# Patient Record
Sex: FEMALE | Race: WHITE | NOT HISPANIC OR LATINO | Employment: FULL TIME | URBAN - METROPOLITAN AREA
[De-identification: names, ages, dates, MRNs, and addresses within clinical notes are randomized per-mention and may not be internally consistent; named-entity substitution may affect disease eponyms.]

---

## 2023-11-02 ENCOUNTER — APPOINTMENT (OUTPATIENT)
Dept: RADIOLOGY | Facility: CLINIC | Age: 28
End: 2023-11-02

## 2023-12-11 ENCOUNTER — OFFICE VISIT (OUTPATIENT)
Dept: PLASTIC SURGERY | Facility: CLINIC | Age: 28
End: 2023-12-11
Payer: COMMERCIAL

## 2023-12-11 VITALS
DIASTOLIC BLOOD PRESSURE: 84 MMHG | HEART RATE: 72 BPM | BODY MASS INDEX: 47.29 KG/M2 | WEIGHT: 293 LBS | SYSTOLIC BLOOD PRESSURE: 159 MMHG

## 2023-12-11 DIAGNOSIS — N62 MACROMASTIA: Primary | ICD-10-CM

## 2023-12-11 PROCEDURE — 99205 OFFICE O/P NEW HI 60 MIN: CPT | Performed by: SURGERY

## 2023-12-11 NOTE — PROGRESS NOTES
Department of Plastic and Reconstructive Surgery            New patient visit    Date: 12/11/2023       Subjective   Stacy Coffman is a 28 y.o. female who was self-referred for macromastia.    Stacy is a 28-year-old female who has long had macromastia, chronic neck and back pain.  She is a non-smoker.  Past medical history is noncontributory.        Objective    Vitals:    12/11/23 1502   BP: 159/84   Pulse: 72       Gen: interactive and pleasant  Head: NCAT  Eyes: EOMI, PERRLA  Mouth: MMM  Throat: trachea midline  Cor: RRR  Pulm: nonlabored breathing  Abd: s/nt/nd  Neuro: AAOx3  Ext: extremities perfused    Body mass index is 47.29 kg/m².          Assessment/Plan       Stacy is a 28 y.o. female who presents for macromastia.    Patient suffers from obvious macromastia.    The patient requires over-the-counter medication and states approximately 15-20 times per month to alleviate the neck and back pain related to macromastia  Patient has not attempted physical therapy, nor has she and has attempted weight loss, neither of which have alleviated symptoms.      Plan:   We will refer to physical therapy colleagues as well as we will referred her to weight loss management.  We discussed with her that elective surgery would not be undertaken until BMI was under 40.    I spent 60 minutes with this patient. Greater than 50% of this time was spent in the counselling and/or coordination of care of this patient.  This note was created using voice recognition software and was not corrected for typographical or grammatical errors.    Signature: Toby Campoverde MD   Date: 12/11/2023

## 2023-12-18 ENCOUNTER — APPOINTMENT (OUTPATIENT)
Dept: NUTRITION | Facility: CLINIC | Age: 28
End: 2023-12-18
Payer: COMMERCIAL

## 2024-01-09 ENCOUNTER — EVALUATION (OUTPATIENT)
Dept: PHYSICAL THERAPY | Facility: CLINIC | Age: 29
End: 2024-01-09
Payer: COMMERCIAL

## 2024-01-09 ENCOUNTER — APPOINTMENT (OUTPATIENT)
Dept: NUTRITION | Facility: CLINIC | Age: 29
End: 2024-01-09
Payer: COMMERCIAL

## 2024-01-09 DIAGNOSIS — M54.2 NECK PAIN: Primary | ICD-10-CM

## 2024-01-09 DIAGNOSIS — M54.9 MID BACK PAIN: ICD-10-CM

## 2024-01-09 DIAGNOSIS — M54.50 LOW BACK PAIN: ICD-10-CM

## 2024-01-09 DIAGNOSIS — N62 MACROMASTIA: ICD-10-CM

## 2024-01-09 PROCEDURE — 97110 THERAPEUTIC EXERCISES: CPT | Mod: GP | Performed by: PHYSICAL THERAPIST

## 2024-01-09 PROCEDURE — 97161 PT EVAL LOW COMPLEX 20 MIN: CPT | Mod: GP | Performed by: PHYSICAL THERAPIST

## 2024-01-09 ASSESSMENT — PAIN DESCRIPTION - DESCRIPTORS: DESCRIPTORS: SHARP;ACHING

## 2024-01-09 ASSESSMENT — ENCOUNTER SYMPTOMS
OCCASIONAL FEELINGS OF UNSTEADINESS: 0
DEPRESSION: 0
LOSS OF SENSATION IN FEET: 0

## 2024-01-09 ASSESSMENT — PATIENT HEALTH QUESTIONNAIRE - PHQ9
9. THOUGHTS THAT YOU WOULD BE BETTER OFF DEAD, OR OF HURTING YOURSELF: NOT AT ALL
SUM OF ALL RESPONSES TO PHQ QUESTIONS 1-9: 13
8. MOVING OR SPEAKING SO SLOWLY THAT OTHER PEOPLE COULD HAVE NOTICED. OR THE OPPOSITE, BEING SO FIGETY OR RESTLESS THAT YOU HAVE BEEN MOVING AROUND A LOT MORE THAN USUAL: MORE THAN HALF THE DAYS
10. IF YOU CHECKED OFF ANY PROBLEMS, HOW DIFFICULT HAVE THESE PROBLEMS MADE IT FOR YOU TO DO YOUR WORK, TAKE CARE OF THINGS AT HOME, OR GET ALONG WITH OTHER PEOPLE: NOT DIFFICULT AT ALL
7. TROUBLE CONCENTRATING ON THINGS, SUCH AS READING THE NEWSPAPER OR WATCHING TELEVISION: SEVERAL DAYS
3. TROUBLE FALLING OR STAYING ASLEEP OR SLEEPING TOO MUCH: SEVERAL DAYS
2. FEELING DOWN, DEPRESSED OR HOPELESS: MORE THAN HALF THE DAYS
6. FEELING BAD ABOUT YOURSELF - OR THAT YOU ARE A FAILURE OR HAVE LET YOURSELF OR YOUR FAMILY DOWN: MORE THAN HALF THE DAYS
1. LITTLE INTEREST OR PLEASURE IN DOING THINGS: MORE THAN HALF THE DAYS
5. POOR APPETITE OR OVEREATING: MORE THAN HALF THE DAYS
SUM OF ALL RESPONSES TO PHQ9 QUESTIONS 1 AND 2: 4
4. FEELING TIRED OR HAVING LITTLE ENERGY: SEVERAL DAYS

## 2024-01-09 ASSESSMENT — PAIN - FUNCTIONAL ASSESSMENT: PAIN_FUNCTIONAL_ASSESSMENT: 0-10

## 2024-01-09 ASSESSMENT — PAIN SCALES - GENERAL: PAINLEVEL_OUTOF10: 4

## 2024-01-09 NOTE — PROGRESS NOTES
Physical Therapy    Physical Therapy Cervical Spine Evaluation    Patient Name: Stacy Coffman  MRN: [id number]  Today's Date: 1/9/2024  Time Calculation  Start Time: 1000  Stop Time: 1045  Time Calculation (min): 45 min    Current Problem  Problem List Items Addressed This Visit             ICD-10-CM    Neck pain - Primary M54.2    Relevant Orders    Follow Up In Physical Therapy    Mid back pain M54.9    Relevant Orders    Follow Up In Physical Therapy    Low back pain M54.50    Relevant Orders    Follow Up In Physical Therapy     Other Visit Diagnoses         Codes    Macromastia     N62    Relevant Orders    Follow Up In Physical Therapy           Precautions  Precautions  Precautions Comment: none     Pain  Pain Assessment: 0-10  Pain Score: 4 (7/10 at worst)  Pain Location: Neck (mid and low back)  Pain Descriptors: Sharp, Aching  Pain Frequency: Constant/continuous    SUBJECTIVE:   Chief complaint:  Pt reports she has has a hx of upper back, neck, and low back pain for several years due to macromastia. Pt notes pain can worsen as she stands. Notes better with sitting. Notes pain is constant. Notes some radiation into the UE/LE's in the forearm/shins. Denies any numbness and tingling. Notes some tightness in the cervical region. Notes some weakness in the UE and postural region.     Pain Better: sitting, heat, OTC meds    Pain Worse: standing, lifting     Imaging: no imaging noted at this time.     Prior level of function: previously independent with all prior activity/ADL's.   Current limitations: standing, lifting, bending.    Home setup:reviewed and no concern     Work: Call center customer service     Patients goal: pain management    Prior tx:no prior PT tx this yr or last yr.     Medical hx has been reviewed with the patient.     OBJECTIVE:    Posture: rounded shoulder/forward head     Upper extremity ROM: WNL Unless documented below:  LE ROM WNL      Upper Extremity Strength:   RIGHT LEFT   Shoulder  Flexion 4+ 4+   Shoulder Abduction 4+ 4+   Shoulder ER 4+ 4+   Shoulder IR 4+ 4+   Rhomboids 4 4   Mid trap  4 4   LE strength: Grossly 5/5 throughout     Cervical ROM:  Date: eval ROM-degrees    Flexion 35 pain     Extension 50     RIGHT LEFT   Side bend     Rotation 55 55     Lumbar ROM:  Date: eval Percentage     Flexion 75%    Extension 50%     RIGHT LEFT   Side bend 75% 75%   Rotation 50% 50%     Palpation: tenderness throughout the B UT cervical spine.    Special tests:  Stephen Cervical repeated movements: all negative or inconclusive for pain production/reduction  Lumbar repeated movements: LI reduced low back pain. RFIS worsened pain    Other Measures  Neck Disability Index: 30%  Oswestry Disablity Index (PEREZ): 28%     TREATMENT:  Eval  2. Instruction in a HEP:   Access Code: [email or web address]  URL: [email or web address]  Date: 01/09/2024  Prepared by: JACKELINE St    Exercises  - Seated Thoracic Lumbar Extension  - 1 x daily - 7 x weekly - 1 sets - 10 reps - 5 sec hold  - Seated Cervical Sidebending Stretch  - 1 x daily - 7 x weekly - 1 sets - 5 reps - 15 sec hold  - Cat Cow to Child's Pose  - 1 x daily - 7 x weekly - 2 sets - 10 reps  - Child's Pose Stretch  - 1 x daily - 7 x weekly - 1 sets - 5 reps - 10 sec hold  - Standing Row with Anchored Resistance  - 1 x daily - 7 x weekly - 2 sets - 10 reps  - Shoulder extension with resistance - Neutral  - 1 x daily - 7 x weekly - 2 sets - 10 reps  - Standing Lumbar Extension  - 1 x daily - 7 x weekly - 2 sets - 10 reps    ASSESSEMENT  Pt is a 28 y.o.  referred for physical therapy by Toby Campoverde MD  for neck pain/mid back pain and low back pain due to macromastia.. Pt presents with upper/mid/low back pain, loss of motion, reduced posture, weakness and decreased function. Pt's signs and symptoms consistent with muscular strain from macromastia and this patient would benefit from a therapy program to restore prior level of function, decrease  pain, increase AROM, increase strength and improve posture.    The physical therapy prognosis is good for the patient to achieve their goals.   The pt tolerated therapy treatment today well with no adverse effects.  Barriers to therapy/learning include: none    PLAN  The pt will be seen 1-2 days a week for 4-6 weeks.      The pt has been educated about the risks and benefits of physical therapy including manual therapy treatments. Pt agrees with POC and gives consent for treatment.     The patient will benefit from physical therapy treatment to include: therapeutic exercises, therapeutic activities, neuromuscular re-education, manual therapy, modalities, and a home exercise program.     Goals:  Active       PT Problem       STG: Reduce pain at worst to 4/10 with all prior activity in her spine.        Start:  01/09/24    Expected End:  01/29/24            STG: Pt to sit with good posture approx 50-75% of the time.        Start:  01/09/24    Expected End:  01/29/24            Reduce pain at worst to 1-2/10 with all prior activity.        Start:  01/09/24    Expected End:  02/20/24            Increase cervical flexion to 60 degrees, ext to 55 degrees, rotation bilat to 75 degrees for posture, driving and self care.        Start:  01/09/24    Expected End:  02/20/24            Increase lumbar flexion to 100%, ext to %, rotation bilat to 100% for self care, posture and ADL's.        Start:  01/09/24    Expected End:  02/20/24            Pt to have decrease in Oswestry by 10% to display increased overall function. Pt to have NDI score decreased by 10% to display increased overall function.        Start:  01/09/24    Expected End:  02/20/24            Pt to be independent with a HEP for self management.        Start:  01/09/24    Expected End:  02/20/24

## 2024-01-16 ENCOUNTER — NUTRITION (OUTPATIENT)
Dept: NUTRITION | Facility: CLINIC | Age: 29
End: 2024-01-16
Payer: COMMERCIAL

## 2024-01-16 DIAGNOSIS — Z71.3 DIETARY COUNSELING AND SURVEILLANCE: Primary | ICD-10-CM

## 2024-01-16 DIAGNOSIS — N62 MACROMASTIA: ICD-10-CM

## 2024-01-16 PROCEDURE — 97802 MEDICAL NUTRITION INDIV IN: CPT

## 2024-01-18 ENCOUNTER — TREATMENT (OUTPATIENT)
Dept: PHYSICAL THERAPY | Facility: CLINIC | Age: 29
End: 2024-01-18
Payer: COMMERCIAL

## 2024-01-18 DIAGNOSIS — N62 MACROMASTIA: ICD-10-CM

## 2024-01-18 DIAGNOSIS — M54.2 NECK PAIN: Primary | ICD-10-CM

## 2024-01-18 DIAGNOSIS — M54.50 LOW BACK PAIN: ICD-10-CM

## 2024-01-18 DIAGNOSIS — M54.9 MID BACK PAIN: ICD-10-CM

## 2024-01-18 PROCEDURE — 97110 THERAPEUTIC EXERCISES: CPT | Mod: GP | Performed by: PHYSICAL THERAPIST

## 2024-01-18 ASSESSMENT — PAIN - FUNCTIONAL ASSESSMENT: PAIN_FUNCTIONAL_ASSESSMENT: 0-10

## 2024-01-18 ASSESSMENT — PAIN SCALES - GENERAL: PAINLEVEL_OUTOF10: 4

## 2024-01-18 NOTE — PROGRESS NOTES
Physical Therapy    Physical Therapy Treatment    Patient Name: Stacy Coffman  MRN: [id number]  Today's Date: 1/18/2024  Time Calculation  Start Time: 0830  Stop Time: 0915  Time Calculation (min): 45 min    Current Problem  Problem List Items Addressed This Visit             ICD-10-CM    Neck pain - Primary M54.2    Mid back pain M54.9    Low back pain M54.50     Other Visit Diagnoses         Codes    Macromastia     N62           Subjective   Pt reports she did ok with the HEP thus far. Notes some soreness post exercises.   Compliance with HEP-yes    Precautions  Precautions  Precautions Comment: none    Pain  Pain Assessment: 0-10  Pain Score: 4  Pain Location: Neck    Objective   No measures     Treatments:  There-ex: x 45 min   UBE x 5 min  Trunk flex with ball 10 x ea  Trunk flex with ball and S/B R/L 10 x ea  T-band rows GTB 2 x 10  T-band ext GTB 2 x 10   T-band ER RTB 2 x 10   Thoracic ext over 1/2 foam roll 2 x 10   Shoulder dumbbells flex, abd 2# 2 x 10   Scap retraction 2 x 10 2#  T- bar ext 3# 2 x 10  3 way posture 3 x 10 sec ea    Reviewed HEP     Assessment:  Pt overall tolerated tx well today with no issues. Some soreness noted post tx. Recommended to continue with current HEP.     Plan:  Pt to continue with strengthening her posture as tolerated.     Goals:  Active       PT Problem       STG: Reduce pain at worst to 4/10 with all prior activity in her spine.        Start:  01/09/24    Expected End:  01/29/24            STG: Pt to sit with good posture approx 50-75% of the time.        Start:  01/09/24    Expected End:  01/29/24            Reduce pain at worst to 1-2/10 with all prior activity.        Start:  01/09/24    Expected End:  02/20/24            Increase cervical flexion to 60 degrees, ext to 55 degrees, rotation bilat to 75 degrees for posture, driving and self care.        Start:  01/09/24    Expected End:  02/20/24            Increase lumbar flexion to 100%, ext to %, rotation  bilat to 100% for self care, posture and ADL's.        Start:  01/09/24    Expected End:  02/20/24            Pt to have decrease in Oswestry by 10% to display increased overall function. Pt to have NDI score decreased by 10% to display increased overall function.        Start:  01/09/24    Expected End:  02/20/24            Pt to be independent with a HEP for self management.        Start:  01/09/24    Expected End:  02/20/24

## 2024-01-23 ENCOUNTER — DOCUMENTATION (OUTPATIENT)
Dept: PHYSICAL THERAPY | Facility: CLINIC | Age: 29
End: 2024-01-23
Payer: COMMERCIAL

## 2024-01-23 ENCOUNTER — OFFICE VISIT (OUTPATIENT)
Dept: URGENT CARE | Facility: CLINIC | Age: 29
End: 2024-01-23
Payer: COMMERCIAL

## 2024-01-23 ENCOUNTER — APPOINTMENT (OUTPATIENT)
Dept: PHYSICAL THERAPY | Facility: CLINIC | Age: 29
End: 2024-01-23
Payer: COMMERCIAL

## 2024-01-23 ENCOUNTER — HOSPITAL ENCOUNTER (EMERGENCY)
Facility: HOSPITAL | Age: 29
Discharge: HOME | End: 2024-01-23
Payer: COMMERCIAL

## 2024-01-23 VITALS
HEIGHT: 68 IN | HEART RATE: 76 BPM | BODY MASS INDEX: 44.41 KG/M2 | WEIGHT: 293 LBS | DIASTOLIC BLOOD PRESSURE: 84 MMHG | SYSTOLIC BLOOD PRESSURE: 153 MMHG | RESPIRATION RATE: 16 BRPM | TEMPERATURE: 97.3 F | OXYGEN SATURATION: 95 %

## 2024-01-23 VITALS
HEART RATE: 98 BPM | TEMPERATURE: 98.4 F | OXYGEN SATURATION: 95 % | SYSTOLIC BLOOD PRESSURE: 136 MMHG | DIASTOLIC BLOOD PRESSURE: 79 MMHG

## 2024-01-23 DIAGNOSIS — K64.9 HEMORRHOIDS, UNSPECIFIED HEMORRHOID TYPE: ICD-10-CM

## 2024-01-23 DIAGNOSIS — J02.9 VIRAL PHARYNGITIS: Primary | ICD-10-CM

## 2024-01-23 DIAGNOSIS — J02.9 SORE THROAT: ICD-10-CM

## 2024-01-23 PROBLEM — N62 LARGE BREASTS: Status: ACTIVE | Noted: 2024-01-23

## 2024-01-23 PROBLEM — F32.A DEPRESSIVE DISORDER: Status: ACTIVE | Noted: 2024-01-23

## 2024-01-23 PROBLEM — R07.9 CHEST PAIN: Status: RESOLVED | Noted: 2024-01-23 | Resolved: 2024-01-23

## 2024-01-23 PROBLEM — N94.6 DYSMENORRHEA: Status: ACTIVE | Noted: 2024-01-23

## 2024-01-23 PROBLEM — R51.9 CHRONIC HEADACHE: Status: ACTIVE | Noted: 2019-03-18

## 2024-01-23 PROBLEM — N94.6 SEVERE MENSTRUAL CRAMPS: Status: ACTIVE | Noted: 2024-01-23

## 2024-01-23 PROBLEM — G89.29 CHRONIC HEADACHE: Status: ACTIVE | Noted: 2019-03-18

## 2024-01-23 PROBLEM — F90.9 ATTENTION-DEFICIT/HYPERACTIVITY DISORDER: Status: ACTIVE | Noted: 2024-01-23

## 2024-01-23 PROBLEM — Q14.2 OPTIC DISC ANOMALY: Status: ACTIVE | Noted: 2017-12-11

## 2024-01-23 PROBLEM — F32.A ANXIETY AND DEPRESSION: Status: ACTIVE | Noted: 2024-01-23

## 2024-01-23 PROBLEM — F41.9 ANXIETY AND DEPRESSION: Status: ACTIVE | Noted: 2024-01-23

## 2024-01-23 PROBLEM — H52.13 MYOPIA OF BOTH EYES: Status: ACTIVE | Noted: 2017-12-11

## 2024-01-23 PROBLEM — R42 DIZZINESS: Status: RESOLVED | Noted: 2024-01-23 | Resolved: 2024-01-23

## 2024-01-23 PROBLEM — N20.0 NEPHROLITHIASIS: Status: RESOLVED | Noted: 2024-01-23 | Resolved: 2024-01-23

## 2024-01-23 PROBLEM — S92.109A CLOSED FRACTURE OF TALUS: Status: RESOLVED | Noted: 2024-01-23 | Resolved: 2024-01-23

## 2024-01-23 PROBLEM — J30.2 SEASONAL ALLERGIES: Status: ACTIVE | Noted: 2024-01-23

## 2024-01-23 PROBLEM — S93.402A SPRAIN OF LEFT ANKLE: Status: RESOLVED | Noted: 2024-01-23 | Resolved: 2024-01-23

## 2024-01-23 PROBLEM — R31.29 MICROSCOPIC HEMATURIA: Status: RESOLVED | Noted: 2024-01-23 | Resolved: 2024-01-23

## 2024-01-23 PROBLEM — N23 RENAL COLIC: Status: ACTIVE | Noted: 2022-11-09

## 2024-01-23 LAB — POC RAPID STREP: NEGATIVE

## 2024-01-23 PROCEDURE — 87880 STREP A ASSAY W/OPTIC: CPT

## 2024-01-23 PROCEDURE — 99213 OFFICE O/P EST LOW 20 MIN: CPT

## 2024-01-23 PROCEDURE — 99283 EMERGENCY DEPT VISIT LOW MDM: CPT | Performed by: NURSE PRACTITIONER

## 2024-01-23 PROCEDURE — 69210 REMOVE IMPACTED EAR WAX UNI: CPT | Mod: LT | Performed by: NURSE PRACTITIONER

## 2024-01-23 RX ORDER — HYDROCORTISONE 25 MG/G
1 CREAM TOPICAL 2 TIMES DAILY
Qty: 6 G | Refills: 0 | Status: SHIPPED | OUTPATIENT
Start: 2024-01-23 | End: 2024-02-22

## 2024-01-23 RX ORDER — FLUTICASONE PROPIONATE 50 MCG
SPRAY, SUSPENSION (ML) NASAL
COMMUNITY
Start: 2014-05-11

## 2024-01-23 RX ORDER — FLUTICASONE PROPIONATE AND SALMETEROL 50; 250 UG/1; UG/1
POWDER RESPIRATORY (INHALATION)
COMMUNITY
Start: 2023-06-12

## 2024-01-23 RX ORDER — ETONOGESTREL AND ETHINYL ESTRADIOL VAGINAL RING .015; .12 MG/D; MG/D
RING VAGINAL
COMMUNITY
Start: 2020-08-28

## 2024-01-23 RX ORDER — METHYLPREDNISOLONE 4 MG/1
TABLET ORAL
Qty: 21 TABLET | Refills: 0 | Status: SHIPPED | OUTPATIENT
Start: 2024-01-23

## 2024-01-23 RX ORDER — ACETAZOLAMIDE 250 MG/1
1 TABLET ORAL 2 TIMES DAILY
COMMUNITY
Start: 2019-03-18

## 2024-01-23 RX ORDER — FLUOXETINE HYDROCHLORIDE 40 MG/1
CAPSULE ORAL
COMMUNITY
Start: 2022-04-19

## 2024-01-23 RX ORDER — CETIRIZINE HYDROCHLORIDE 10 MG/1
TABLET ORAL
COMMUNITY
Start: 2022-04-19

## 2024-01-23 RX ORDER — ALBUTEROL SULFATE 90 UG/1
AEROSOL, METERED RESPIRATORY (INHALATION)
COMMUNITY
Start: 2023-06-12

## 2024-01-23 ASSESSMENT — ENCOUNTER SYMPTOMS
SINUS PAIN: 0
CHEST TIGHTNESS: 0
PALPITATIONS: 0
MYALGIAS: 0
CHILLS: 0
VOMITING: 0
ARTHRALGIAS: 0
NEUROLOGICAL NEGATIVE: 1
MUSCULOSKELETAL NEGATIVE: 1
SORE THROAT: 1
SHORTNESS OF BREATH: 0
NAUSEA: 0
GASTROINTESTINAL NEGATIVE: 1
DIAPHORESIS: 1
SINUS PRESSURE: 0
RHINORRHEA: 0
FATIGUE: 0
CARDIOVASCULAR NEGATIVE: 1
HEADACHES: 0
DIZZINESS: 0
ABDOMINAL PAIN: 0
ADENOPATHY: 1
TROUBLE SWALLOWING: 1
FEVER: 0
COUGH: 0
DIARRHEA: 0

## 2024-01-23 ASSESSMENT — COLUMBIA-SUICIDE SEVERITY RATING SCALE - C-SSRS
2. HAVE YOU ACTUALLY HAD ANY THOUGHTS OF KILLING YOURSELF?: NO
6. HAVE YOU EVER DONE ANYTHING, STARTED TO DO ANYTHING, OR PREPARED TO DO ANYTHING TO END YOUR LIFE?: NO
1. IN THE PAST MONTH, HAVE YOU WISHED YOU WERE DEAD OR WISHED YOU COULD GO TO SLEEP AND NOT WAKE UP?: NO

## 2024-01-23 NOTE — PROGRESS NOTES
Physical Therapy                 Therapy Communication Note    Patient Name: Stacy Coffman  MRN: [id number]  Today's Date: 1/23/2024     Discipline: Physical Therapy    Missed Visit Reason: Pt cx via [employer].     Missed Time: Cancel    Comment:

## 2024-01-23 NOTE — PROGRESS NOTES
Subjective     Satcy Coffman is a 28 y.o. female who presents for Sore Throat.    Patient presents with sore throat, painful swallowing, and sinus drainage worsening over the last 4 days.       History provided by:  Patient and medical records  Sore Throat   Associated symptoms include ear pain and trouble swallowing. Pertinent negatives include no abdominal pain, congestion, coughing, diarrhea, headaches, shortness of breath or vomiting.       /79   Pulse 98   Temp 36.9 °C (98.4 °F)   LMP  (LMP Unknown)   SpO2 95%    All vitals have been reviewed and are stable.    Review of Systems   Constitutional:  Positive for diaphoresis. Negative for chills, fatigue and fever.   HENT:  Positive for ear pain, postnasal drip, sore throat and trouble swallowing. Negative for congestion, dental problem, rhinorrhea, sinus pressure and sinus pain.    Respiratory:  Negative for cough, chest tightness and shortness of breath.    Cardiovascular: Negative.  Negative for chest pain and palpitations.   Gastrointestinal: Negative.  Negative for abdominal pain, diarrhea, nausea and vomiting.   Musculoskeletal: Negative.  Negative for arthralgias and myalgias.   Skin: Negative.  Negative for rash.   Neurological: Negative.  Negative for dizziness and headaches.   Hematological:  Positive for adenopathy.       Objective   Physical Exam  Vitals and nursing note reviewed.   Constitutional:       General: She is awake. She is not in acute distress.     Appearance: Normal appearance. She is well-developed.   HENT:      Head: Normocephalic and atraumatic.      Right Ear: Tympanic membrane, ear canal and external ear normal.      Left Ear: Tympanic membrane, ear canal and external ear normal.      Nose: Nose normal. No congestion or rhinorrhea.      Mouth/Throat:      Lips: Pink.      Mouth: Mucous membranes are moist.      Palate: No lesions.      Pharynx: Uvula midline. Oropharyngeal exudate and posterior oropharyngeal erythema  present.      Tonsils: No tonsillar exudate.   Eyes:      Extraocular Movements: Extraocular movements intact.      Conjunctiva/sclera: Conjunctivae normal.      Pupils: Pupils are equal, round, and reactive to light.   Cardiovascular:      Rate and Rhythm: Normal rate and regular rhythm.   Pulmonary:      Effort: Pulmonary effort is normal. No respiratory distress.   Abdominal:      General: Abdomen is flat. There is no distension.   Musculoskeletal:         General: No swelling or deformity. Normal range of motion.      Cervical back: Normal range of motion.   Skin:     General: Skin is warm and dry.   Neurological:      General: No focal deficit present.      Mental Status: She is alert and oriented to person, place, and time. Mental status is at baseline.      Motor: Motor function is intact.      Coordination: Coordination is intact.   Psychiatric:         Mood and Affect: Mood and affect normal.         Speech: Speech normal.         Behavior: Behavior normal.         Thought Content: Thought content normal.         Judgment: Judgment normal.         Assessment/Plan   Problem List Items Addressed This Visit    None  Visit Diagnoses       Sore throat        Relevant Medications    methylPREDNISolone (Medrol Dospak) 4 mg tablets    Other Relevant Orders    POCT rapid strep A manually resulted (Completed)            Red flags for reporting to ER have been reviewed with the patient.    Current diagnosis, any medication changes, and all in-office lab or radiologic results have been reviewed with the patient at the time of the visit.   If symptoms do not improve or worsen, patient is to follow up with PCP or report to the emergency room.   Patient is alert and oriented x3 and non-toxic appearing. Vital signs are stable.   Patient and/or guardian has sufficient decision-making capabilities at this time and reports understanding and agreement with the treatment plan made through shared decision-making.

## 2024-01-24 NOTE — ED PROVIDER NOTES
HPI   Chief Complaint   Patient presents with    Earache     left    Sore Throat     Was seen at urgent care today and had strep done and it was negative.     Abdominal Pain     Lower abd cramping, states she has IBS but only has flareups during period. Today she had diarrhea and noticed blood and mucus in stool       28-year-old female with no significant past medical history presents to the emergency department today for sore throat, left ear pain and sinus drainage as well as blood in her stool tonight.  Patient states the sore throat and ear pain have been present for the past 4 days.  She was seen at an urgent care today and had a strep swab completed which was negative.  She was discharged home on a Medrol Dosepak.  States this afternoon she was lying in bed had some abdominal cramping.  She was having a difficult time with a bowel movement and noticed some blood in the toilet water as well as on her tissue paper and became concerned.  Mother has a history of IBS and she states that she is highly suspicious that she may also have irritable bowel syndrome but has not been diagnosed.  No fever or chills.  She is denying any current abdominal pain states she is feeling well overall other than the sore throat and left ear pain.  Patient denies any dizziness, headache, congestion, coughing, shortness of breath, nausea, vomiting.  No syncope.                          Luis Coma Scale Score: 15                  Patient History   Past Medical History:   Diagnosis Date    Chest pain 01/23/2024    Closed fracture of talus 01/23/2024    Dizziness 01/23/2024    Personal history of other mental and behavioral disorders 06/15/2015    History of depression    Sprain of left ankle 01/23/2024     Past Surgical History:   Procedure Laterality Date    OTHER SURGICAL HISTORY  08/24/2020    Lithotripsy     No family history on file.  Social History     Tobacco Use    Smoking status: Not on file    Smokeless tobacco: Not on file    Substance Use Topics    Alcohol use: Not on file    Drug use: Not on file       Physical Exam   ED Triage Vitals [01/23/24 2237]   Temperature Heart Rate Respirations BP   36.3 °C (97.3 °F) 98 18 (!) 187/84      Pulse Ox Temp src Heart Rate Source Patient Position   95 % -- -- --      BP Location FiO2 (%)     -- --       Physical Exam  Vitals and nursing note reviewed.   Constitutional:       General: She is not in acute distress.     Appearance: Normal appearance. She is not toxic-appearing.   HENT:      Right Ear: Tympanic membrane normal. No middle ear effusion. Tympanic membrane is not erythematous.      Left Ear: Tenderness present. A middle ear effusion is present. There is impacted cerumen. Tympanic membrane is not erythematous.      Mouth/Throat:      Mouth: Mucous membranes are moist.      Pharynx: Oropharynx is clear. Posterior oropharyngeal erythema present.   Eyes:      Extraocular Movements: Extraocular movements intact.      Pupils: Pupils are equal, round, and reactive to light.   Cardiovascular:      Rate and Rhythm: Normal rate and regular rhythm.      Pulses: Normal pulses.      Heart sounds: Normal heart sounds.   Pulmonary:      Effort: Pulmonary effort is normal.      Breath sounds: Normal breath sounds.   Abdominal:      General: Abdomen is flat. Bowel sounds are normal.      Palpations: Abdomen is soft.      Tenderness: There is no abdominal tenderness.   Genitourinary:     Rectum: Guaiac result negative.      Comments: Positive external hemorrhoid nonthrombosed at the 6 o'clock position  Musculoskeletal:         General: Normal range of motion.      Cervical back: Normal range of motion and neck supple.   Skin:     General: Skin is warm and dry.      Capillary Refill: Capillary refill takes less than 2 seconds.   Neurological:      General: No focal deficit present.      Mental Status: She is alert and oriented to person, place, and time.   Psychiatric:         Mood and Affect: Mood normal.          Behavior: Behavior normal.         Judgment: Judgment normal.         Labs Reviewed - No data to display  Pain Management Panel           No data to display              No orders to display       ED Course & MDM   Diagnoses as of 01/23/24 5235   Viral pharyngitis   Hemorrhoids, unspecified hemorrhoid type       Medical Decision Making  On initial evaluation patient is well-appearing in the emergency department at this time.  She does have an impacted left TM which was disimpacted see procedure note.  Some improvement in her discomfort.  She does have a middle ear effusion but no sign of infection currently.  Posterior oropharynx is erythematous.  There is no cervical adenopathy.  Patient already did have strep swab today she would like to refrain from strep, COVID or flu testing currently.  Patient does have an external hemorrhoid noted.  There is no abdominal pain on exam.  Imaging or lab work at this time as she is stable and has no current abdominal symptoms and does have a likely history of irritable bowel syndrome with a positive external hemorrhoid.  I did however discuss the importance of close outpatient follow-up and strict return precautions.  I did give her GI for follow-up as she does not currently have a gastroenterologist.  Both patient and her significant other at bedside verbalized understanding agreement this plan have no further questions or concerns patient be discharged home pshared decision making with the patient to refrain from any radiological stable condition.        Procedure  Ear Cerumen Removal    Performed by: BAM Weber  Authorized by: BAM Weber    Consent:     Consent obtained:  Verbal    Consent given by:  Patient    Risks, benefits, and alternatives were discussed: yes      Risks discussed:  Bleeding, infection, pain, TM perforation, incomplete removal and dizziness    Alternatives discussed:  No treatment  Procedure details:      Location:  L ear    Procedure type: curette      Procedure outcomes: cerumen removed    Post-procedure details:     Inspection:  TM intact and ear canal clear    Hearing quality:  Improved    Procedure completion:  Tolerated well, no immediate complications        Differential Diagnoses include colitis, irritable bowel syndrome, Crohn's, hemorrhoid, strep, COVID, flu, mono  This is not an exhaustive list of all the diagnosis and therapeutics are considered during the patient's evaluation for an emergency medical condition.    I discussed the differential, results and discharge plan with the patient and/or family/friend/caregiver if present.  I emphasized the importance of follow-up with the physician I referred them to in the timeframe recommended.  I explained reasons for the patient to return to the Emergency Department. Additional verbal discharge instructions were also given and discussed with the patient to supplement those generated by the EMR. We also discussed medications that were prescribed (if any) including common side effects and interactions. The patient was advised to abstain from driving, operating heavy machinery or making significant decisions while taking medications such as opiates and muscle relaxers that may impair this. All questions were addressed.  They understand return precautions and discharge instructions. The patient and/or family/friend/caregiver expressed understanding.           Griselda Hodgson, KALIA-ROSIE  01/23/24 0108

## 2024-01-30 ENCOUNTER — TREATMENT (OUTPATIENT)
Dept: PHYSICAL THERAPY | Facility: CLINIC | Age: 29
End: 2024-01-30
Payer: COMMERCIAL

## 2024-01-30 DIAGNOSIS — M54.2 NECK PAIN: Primary | ICD-10-CM

## 2024-01-30 DIAGNOSIS — N62 MACROMASTIA: ICD-10-CM

## 2024-01-30 DIAGNOSIS — M54.50 LOW BACK PAIN: ICD-10-CM

## 2024-01-30 DIAGNOSIS — M54.9 MID BACK PAIN: ICD-10-CM

## 2024-01-30 PROCEDURE — 97110 THERAPEUTIC EXERCISES: CPT | Mod: GP | Performed by: PHYSICAL THERAPIST

## 2024-01-30 ASSESSMENT — PAIN - FUNCTIONAL ASSESSMENT: PAIN_FUNCTIONAL_ASSESSMENT: 0-10

## 2024-01-30 ASSESSMENT — PAIN SCALES - GENERAL: PAINLEVEL_OUTOF10: 3

## 2024-01-30 NOTE — PROGRESS NOTES
Physical Therapy    Physical Therapy Treatment    Patient Name: Stacy Coffman  MRN: [id number]  Today's Date: 1/30/2024  Time Calculation  Start Time: 0918  Stop Time: 1003  Time Calculation (min): 45 min  Payor: [employer] / Plan: [employer]MP / Product Type: *No Product type* /     Current Problem  Problem List Items Addressed This Visit             ICD-10-CM    Neck pain - Primary M54.2    Mid back pain M54.9    Low back pain M54.50     Other Visit Diagnoses         Codes    Macromastia     N62             Subjective   Pt states was sick and went to ER last week, but feeling better. Pt notes she continues to feel some soreness after completing exercises.   Compliance with HEP-yes  Precautions       Pain  Pain Assessment: 0-10  Pain Score: 3  Pain Location: Neck    Objective   No measures.     Treatments:  There-ex: x 45 min   UBE x 5 min  Trunk flex with ball 10 x ea  Trunk flex with ball and S/B R/L 10 x ea  T-band rows GTB 2 x 10  T-band ext GTB 2 x 10   T-band ER RTB 2 x 10   T-band IR RTB 2 x 10  Thoracic ext over 1/2 foam roll 2 x 10   Shoulder dumbbells standing IYT 2# 2 x 10   Scap retraction 2 x 10 2#  Mod prone rows 2# 2 x 10  T- bar ext 3# 2 x 10  3 way posture 3 x 10 sec ea  Assessment:   Pt tolerated today's session well, noting some muscle soreness and fatigue with strengthening exercises by end of session.    Plan:   Continue with strengthening UE and scapular musculature, progress with mod prone IYT exercises.     Goals:  Active       PT Problem       STG: Reduce pain at worst to 4/10 with all prior activity in her spine.        Start:  01/09/24    Expected End:  01/29/24            STG: Pt to sit with good posture approx 50-75% of the time.        Start:  01/09/24    Expected End:  01/29/24            Reduce pain at worst to 1-2/10 with all prior activity.        Start:  01/09/24    Expected End:  02/20/24            Increase cervical flexion to 60 degrees, ext to 55 degrees, rotation bilat to 75  degrees for posture, driving and self care.        Start:  01/09/24    Expected End:  02/20/24            Increase lumbar flexion to 100%, ext to %, rotation bilat to 100% for self care, posture and ADL's.        Start:  01/09/24    Expected End:  02/20/24            Pt to have decrease in Oswestry by 10% to display increased overall function. Pt to have NDI score decreased by 10% to display increased overall function.        Start:  01/09/24    Expected End:  02/20/24            Pt to be independent with a HEP for self management.        Start:  01/09/24    Expected End:  02/20/24             Note signed by Jeri Kraus, SPT

## 2024-02-01 ENCOUNTER — TREATMENT (OUTPATIENT)
Dept: PHYSICAL THERAPY | Facility: CLINIC | Age: 29
End: 2024-02-01
Payer: COMMERCIAL

## 2024-02-01 DIAGNOSIS — M54.2 NECK PAIN: Primary | ICD-10-CM

## 2024-02-01 DIAGNOSIS — M54.50 LOW BACK PAIN: ICD-10-CM

## 2024-02-01 DIAGNOSIS — N62 MACROMASTIA: ICD-10-CM

## 2024-02-01 DIAGNOSIS — M54.9 MID BACK PAIN: ICD-10-CM

## 2024-02-01 PROCEDURE — 97110 THERAPEUTIC EXERCISES: CPT | Mod: GP | Performed by: PHYSICAL THERAPIST

## 2024-02-01 ASSESSMENT — PAIN SCALES - GENERAL: PAINLEVEL_OUTOF10: 4

## 2024-02-01 ASSESSMENT — PAIN - FUNCTIONAL ASSESSMENT: PAIN_FUNCTIONAL_ASSESSMENT: 0-10

## 2024-02-01 NOTE — PROGRESS NOTES
Physical Therapy    Physical Therapy Treatment    Patient Name: Stacy Coffman  MRN: [id number]  Today's Date: 2/1/2024  Time Calculation  Start Time: 0747  Stop Time: 0832  Time Calculation (min): 45 min  Payor: [employer] / Plan: [employer] / Product Type: *No Product type* /     Current Problem  Problem List Items Addressed This Visit             ICD-10-CM    Neck pain - Primary M54.2    Mid back pain M54.9    Low back pain M54.50     Other Visit Diagnoses         Codes    Macromastia     N62             Subjective   Pt reports increased soreness in back and legs after last visit. Pt notes some soreness coming into today's session from stretching exercises. Pt notes increased in back pain from sleeping at beginning of session.   Compliance with HEP- yes  Precautions       Pain  Pain Assessment: 0-10  Pain Score: 4  Pain Location: Neck      Objective   No measures.     Treatments:  There-ex: x 45 min   UBE x 5 min  Trunk flex with ball 10 x ea  Trunk flex with ball and S/B R/L 10 x ea  T-band rows GTB 2 x 10  T-band ext GTB 2 x 10   T-band ER RTB 2 x 10   T-band IR RTB 2 x 10  Thoracic ext over 1/2 foam roll 2 x 10   Mod prone shoulder dumbbells IYT 2# 2 x 10   Scap retraction 2 x 10 2#  Mod prone rows 2# 2 x 10  T- bar ext 4# 2 x 10  3 way posture 2 x 10  2-3sec ea  Assessment:   Pt experienced slight discomfort with IR exercise that ceased with rest. Pt noted increased soreness with today's exercise at end of session. Pt presents with increase in proper posture throughout session.     Plan:   Continue to progress strengthening exercises as tolerated, update HEP with 3 way wall posture exercise.    Goals:  Active       PT Problem       STG: Reduce pain at worst to 4/10 with all prior activity in her spine.        Start:  01/09/24    Expected End:  01/29/24            STG: Pt to sit with good posture approx 50-75% of the time.        Start:  01/09/24    Expected End:  01/29/24            Reduce pain at worst to  1-2/10 with all prior activity.        Start:  01/09/24    Expected End:  02/20/24            Increase cervical flexion to 60 degrees, ext to 55 degrees, rotation bilat to 75 degrees for posture, driving and self care.        Start:  01/09/24    Expected End:  02/20/24            Increase lumbar flexion to 100%, ext to %, rotation bilat to 100% for self care, posture and ADL's.        Start:  01/09/24    Expected End:  02/20/24            Pt to have decrease in Oswestry by 10% to display increased overall function. Pt to have NDI score decreased by 10% to display increased overall function.        Start:  01/09/24    Expected End:  02/20/24            Pt to be independent with a HEP for self management.        Start:  01/09/24    Expected End:  02/20/24             Note signed by Jeri Kraus, SPT

## 2024-02-06 ENCOUNTER — TREATMENT (OUTPATIENT)
Dept: PHYSICAL THERAPY | Facility: CLINIC | Age: 29
End: 2024-02-06
Payer: COMMERCIAL

## 2024-02-06 DIAGNOSIS — M54.2 NECK PAIN: Primary | ICD-10-CM

## 2024-02-06 DIAGNOSIS — N62 MACROMASTIA: ICD-10-CM

## 2024-02-06 DIAGNOSIS — M54.9 MID BACK PAIN: ICD-10-CM

## 2024-02-06 DIAGNOSIS — M54.50 LOW BACK PAIN: ICD-10-CM

## 2024-02-06 PROCEDURE — 97110 THERAPEUTIC EXERCISES: CPT | Mod: GP | Performed by: PHYSICAL THERAPIST

## 2024-02-06 ASSESSMENT — PAIN - FUNCTIONAL ASSESSMENT: PAIN_FUNCTIONAL_ASSESSMENT: 0-10

## 2024-02-06 ASSESSMENT — PAIN SCALES - GENERAL: PAINLEVEL_OUTOF10: 4

## 2024-02-06 NOTE — PROGRESS NOTES
Physical Therapy    Physical Therapy Treatment    Patient Name: Stacy Coffman  MRN: [id number]  Today's Date: 2/6/2024  Time Calculation  Start Time: 0916  Stop Time: 1002  Time Calculation (min): 46 min  Payor: [employer] / Plan: [employer] / Product Type: *No Product type* /     Reason for Referral: neck pain  General Comment: visit 5 of 10 (visits [place])    Current Problem    Problem List Items Addressed This Visit             ICD-10-CM    Neck pain - Primary M54.2    Mid back pain M54.9    Low back pain M54.50     Other Visit Diagnoses         Codes    Macromastia     N62           Subjective   Pt woke up with more soreness in neck this AM and pt. Notes increase in mid back pain at start of today's session.   Compliance with HEP-yes  Precautions  Precautions  Precautions Comment: none    Pain  Pain Assessment: 0-10  Pain Score: 4  Pain Location: Neck    Objective   No measures.     Treatments:  There-ex: x 45 min   UBE x 5 min  Trunk flex with ball 10 x ea  Trunk flex with ball and S/B R/L 10 x ea  T-band rows GTB 2 x 10  T-band ext GTB 2 x 10   T-band ER GTB 2 x 10   T-band IR RTB 2 x 10  Thoracic ext over 1/2 foam roll 2 x 10   Mod prone shoulder dumbbells IYT 2# 2 x 10   Scap retraction 2 x 10 2#  Mod prone rows 2# 2 x 10  T- bar ext 4# 2 x 10  3 way posture 2 x 10  2-3sec ea  Assessment:   Pt responded well to today's session, with increase in resistance and weight on scap retraction and T-band ER exercise. Pt was challenged by end of today's session with reports of muscle soreness and fatigue. Discussed with patient adding 3 way posture exercise to HEP.     Plan:   Continue with strengthening exercise and progress with increased resistance as tolerated.      Goals:  Active       PT Problem       STG: Reduce pain at worst to 4/10 with all prior activity in her spine.  (Met)       Start:  01/09/24    Expected End:  01/29/24    Resolved:  02/06/24         STG: Pt to sit with good posture approx 50-75% of the  time.  (Met)       Start:  01/09/24    Expected End:  01/29/24    Resolved:  02/06/24         Reduce pain at worst to 1-2/10 with all prior activity.  (Progressing)       Start:  01/09/24    Expected End:  02/20/24            Increase cervical flexion to 60 degrees, ext to 55 degrees, rotation bilat to 75 degrees for posture, driving and self care.  (Progressing)       Start:  01/09/24    Expected End:  02/20/24            Increase lumbar flexion to 100%, ext to %, rotation bilat to 100% for self care, posture and ADL's.  (Progressing)       Start:  01/09/24    Expected End:  02/20/24            Pt to have decrease in Oswestry by 10% to display increased overall function. Pt to have NDI score decreased by 10% to display increased overall function.  (Progressing)       Start:  01/09/24    Expected End:  02/20/24            Pt to be independent with a HEP for self management.  (Progressing)       Start:  01/09/24    Expected End:  02/20/24             Note signed by Jeri Kraus, SPT

## 2024-02-08 ENCOUNTER — TREATMENT (OUTPATIENT)
Dept: PHYSICAL THERAPY | Facility: CLINIC | Age: 29
End: 2024-02-08
Payer: COMMERCIAL

## 2024-02-08 DIAGNOSIS — M54.50 LOW BACK PAIN: ICD-10-CM

## 2024-02-08 DIAGNOSIS — M54.9 MID BACK PAIN: ICD-10-CM

## 2024-02-08 DIAGNOSIS — M54.2 NECK PAIN: Primary | ICD-10-CM

## 2024-02-08 DIAGNOSIS — N62 MACROMASTIA: ICD-10-CM

## 2024-02-08 PROCEDURE — 97110 THERAPEUTIC EXERCISES: CPT | Mod: GP | Performed by: PHYSICAL THERAPIST

## 2024-02-08 ASSESSMENT — PAIN - FUNCTIONAL ASSESSMENT: PAIN_FUNCTIONAL_ASSESSMENT: 0-10

## 2024-02-08 ASSESSMENT — PAIN SCALES - GENERAL: PAINLEVEL_OUTOF10: 5 - MODERATE PAIN

## 2024-02-08 NOTE — PROGRESS NOTES
Physical Therapy    Physical Therapy Treatment    Patient Name: Stacy Coffman  MRN: [id number]  Today's Date: 2/8/2024  Time Calculation  Start Time: 0700  Payor: [employer] / Plan: [employer] / Product Type: *No Product type* /     Reason for Referral: neck pain  General Comment: visit 6 of 10 (visits [place])    Current Problem    Problem List Items Addressed This Visit             ICD-10-CM    Neck pain - Primary M54.2    Mid back pain M54.9    Low back pain M54.50     Other Visit Diagnoses         Codes    Macromastia     N62             Subjective   Pt reports increased neck pain starting yesterday noting  4-5/10 on pain rating scale. Pt notes increased soreness in B UE and mid back from last session.   Compliance with HEP- yes  Precautions  Precautions  Precautions Comment: none    Pain  Pain Assessment: 0-10  Pain Score: 5 - Moderate pain  Pain Location: Neck    Objective   No measures.    Treatments:  There-ex: x 44 min   UBE x 5 min  Trunk flex with ball 10 x ea  Trunk flex with ball and S/B R/L 10 x ea  T-band rows BTB 2 x 10  T-band ext BTB 2 x 10   T-band ER GTB 2 x 10 with towel under arm   T-band IR GTB 2 x 10  Thoracic ext over 1/2 foam roll 2 x 10   Standing wall shoulder flexion with T-bar #3 - towel behind neck for good posture  T- bar ext 4# 2 x 10  Mod prone shoulder dumbbells IYT 2# 2 x 10   Scap retraction 2 x 10 3#  Mod prone rows 2# 2 x 10  Assessment:   Pt required slight verbal cueing for scapular retraction for proper positioning and muscle activation. Pt challenged by today's session with progression of increased resistance with t-band rows and ext exercise. Pt notes muscle fatigue and soreness at end of session, pt continues to be challenged with mod prone IYT exercise.     Plan:   Continue with stretching and strengthening exercise, progress with adding bird dogs and increasing weight as tolerated.     Goals:  Active       PT Problem       STG: Reduce pain at worst to 4/10 with all prior  activity in her spine.  (Met)       Start:  01/09/24    Expected End:  01/29/24    Resolved:  02/06/24         STG: Pt to sit with good posture approx 50-75% of the time.  (Met)       Start:  01/09/24    Expected End:  01/29/24    Resolved:  02/06/24         Reduce pain at worst to 1-2/10 with all prior activity.  (Progressing)       Start:  01/09/24    Expected End:  02/20/24            Increase cervical flexion to 60 degrees, ext to 55 degrees, rotation bilat to 75 degrees for posture, driving and self care.  (Progressing)       Start:  01/09/24    Expected End:  02/20/24            Increase lumbar flexion to 100%, ext to %, rotation bilat to 100% for self care, posture and ADL's.  (Progressing)       Start:  01/09/24    Expected End:  02/20/24            Pt to have decrease in Oswestry by 10% to display increased overall function. Pt to have NDI score decreased by 10% to display increased overall function.  (Progressing)       Start:  01/09/24    Expected End:  02/20/24            Pt to be independent with a HEP for self management.  (Progressing)       Start:  01/09/24    Expected End:  02/20/24             Note signed by Jeri Kraus, SPT

## 2024-02-13 ENCOUNTER — APPOINTMENT (OUTPATIENT)
Dept: PHYSICAL THERAPY | Facility: CLINIC | Age: 29
End: 2024-02-13
Payer: COMMERCIAL

## 2024-02-15 ENCOUNTER — TREATMENT (OUTPATIENT)
Dept: PHYSICAL THERAPY | Facility: CLINIC | Age: 29
End: 2024-02-15
Payer: COMMERCIAL

## 2024-02-15 DIAGNOSIS — M54.9 MID BACK PAIN: ICD-10-CM

## 2024-02-15 DIAGNOSIS — M54.50 LOW BACK PAIN: ICD-10-CM

## 2024-02-15 DIAGNOSIS — N62 MACROMASTIA: ICD-10-CM

## 2024-02-15 DIAGNOSIS — M54.2 NECK PAIN: Primary | ICD-10-CM

## 2024-02-15 PROCEDURE — 97110 THERAPEUTIC EXERCISES: CPT | Mod: GP | Performed by: PHYSICAL THERAPIST

## 2024-02-15 ASSESSMENT — PAIN - FUNCTIONAL ASSESSMENT: PAIN_FUNCTIONAL_ASSESSMENT: 0-10

## 2024-02-15 ASSESSMENT — PAIN SCALES - GENERAL: PAINLEVEL_OUTOF10: 3

## 2024-02-15 NOTE — PROGRESS NOTES
"Physical Therapy    Physical Therapy Treatment    Patient Name: Stacy Coffman  MRN: [id number]  Today's Date: 2/15/2024  Time Calculation  Start Time: 0700  Stop Time: 0744  Time Calculation (min): 44 min  Payor: [employer] / Plan: [employer] / Product Type: *No Product type* /     Reason for Referral: neck pain  General Comment: visit 7 of 10 (visits [place])    Current Problem    Problem List Items Addressed This Visit             ICD-10-CM    Neck pain - Primary M54.2    Mid back pain M54.9    Low back pain M54.50     Other Visit Diagnoses         Codes    Macromastia     N62             Subjective   Pt notes she is getting less sore after each session. Pt notes pain is primarily after first waking up in AM and gets better throughout the day. She notes she can stand for longer with work and not get increase in familiar s/s.   Compliance with HEP- yes  Precautions  Precautions  Precautions Comment: none    Pain  Pain Assessment: 0-10  Pain Score: 3  Pain Location: Neck    Objective   No measures.    Treatments:  There-ex: x 44 min   UBE x 5 min  Trunk flex with ball 10 x ea  Trunk flex with ball and S/B R/L 10 x ea  Bird dogs 2x10  Thoracic ext over 1/2 foam roll 2 x 10   T-band rows BTB 2 x 10  T-band ext BTB 2 x 10   T-band ER GTB 2 x 10 with towel under arm   T-band IR GTB 2 x 10  Standing wall shoulder flexion with T-bar 4# - neutral neck posture  T- bar ext 4# 2 x 10  Scap retraction 2 x 10 4#  Mod prone GSB shoulder dumbbells IYT 2# 2 x 10   Mod prone GSB shoulder extension 2# 2x10  Mod prone rows 2# 2 x 10    Assessment:   Pt tolerated today's overall treatment well with minimal to no complaints of discomfort. Pt felt bird dog exercise was challenging with balancing and experienced muscle fatigue. Pt noted neck and mid back felt \"good\" at end of session. Pt experienced slight LE muscle cramping during first reps of mod prone exercises but subsided with rest. Pt challenged by today's session with reports of " muscle fatigue/soreness by end of session.    Plan:   Continue with scapular and postural strengthening exercise. Progress with increased resistance/weight as tolerated.     Goals:  Active       PT Problem       STG: Reduce pain at worst to 4/10 with all prior activity in her spine.  (Met)       Start:  01/09/24    Expected End:  01/29/24    Resolved:  02/06/24         STG: Pt to sit with good posture approx 50-75% of the time.  (Met)       Start:  01/09/24    Expected End:  01/29/24    Resolved:  02/06/24         Reduce pain at worst to 1-2/10 with all prior activity.  (Progressing)       Start:  01/09/24    Expected End:  02/20/24            Increase cervical flexion to 60 degrees, ext to 55 degrees, rotation bilat to 75 degrees for posture, driving and self care.  (Progressing)       Start:  01/09/24    Expected End:  02/20/24            Increase lumbar flexion to 100%, ext to %, rotation bilat to 100% for self care, posture and ADL's.  (Progressing)       Start:  01/09/24    Expected End:  02/20/24            Pt to have decrease in Oswestry by 10% to display increased overall function. Pt to have NDI score decreased by 10% to display increased overall function.  (Progressing)       Start:  01/09/24    Expected End:  02/20/24            Pt to be independent with a HEP for self management.  (Progressing)       Start:  01/09/24    Expected End:  02/20/24             Note signed by Jeri Kraus, SPT

## 2024-02-20 ENCOUNTER — APPOINTMENT (OUTPATIENT)
Dept: PHYSICAL THERAPY | Facility: CLINIC | Age: 29
End: 2024-02-20
Payer: COMMERCIAL

## 2024-02-22 ENCOUNTER — APPOINTMENT (OUTPATIENT)
Dept: PHYSICAL THERAPY | Facility: CLINIC | Age: 29
End: 2024-02-22
Payer: COMMERCIAL

## 2024-02-28 ENCOUNTER — TREATMENT (OUTPATIENT)
Dept: PHYSICAL THERAPY | Facility: CLINIC | Age: 29
End: 2024-02-28
Payer: COMMERCIAL

## 2024-02-28 DIAGNOSIS — M54.50 LOW BACK PAIN: ICD-10-CM

## 2024-02-28 DIAGNOSIS — M54.2 NECK PAIN: Primary | ICD-10-CM

## 2024-02-28 DIAGNOSIS — N62 MACROMASTIA: ICD-10-CM

## 2024-02-28 DIAGNOSIS — M54.9 MID BACK PAIN: ICD-10-CM

## 2024-02-28 PROCEDURE — 97110 THERAPEUTIC EXERCISES: CPT | Mod: GP | Performed by: PHYSICAL THERAPIST

## 2024-02-28 ASSESSMENT — PAIN SCALES - GENERAL: PAINLEVEL_OUTOF10: 2

## 2024-02-28 ASSESSMENT — PAIN - FUNCTIONAL ASSESSMENT: PAIN_FUNCTIONAL_ASSESSMENT: 0-10

## 2024-02-28 NOTE — PROGRESS NOTES
Physical Therapy    Physical Therapy Treatment    Patient Name: Stacy Coffman  MRN: [id number]  Today's Date: 2/28/2024  Time Calculation  Start Time: 1700  Stop Time: 1745  Time Calculation (min): 45 min     PT Therapeutic Procedures Time Entry  Therapeutic Exercise Time Entry: 45                 Payor: [employer] / Plan: [employer] / Product Type: *No Product type* /     Reason for Referral: neck pain  General Comment: visit 8 of 10 (visits [place])    Current Problem    Problem List Items Addressed This Visit             ICD-10-CM    Neck pain - Primary M54.2    Mid back pain M54.9    Low back pain M54.50     Other Visit Diagnoses         Codes    Macromastia     N62             Subjective   Pt states she had some soreness after last session and putting heat helped to alleviate s/s. Pt notes she has lost some weight recently and is feeling good. Pt notes she experienced some increase in low back pain starting yesterday with standing at work. Pt notes LBP is about 3-4/10 pain coming in to today's session but notes neck pain is decreased to a 2/10 pain.   Compliance with HEP-yes  Precautions  Precautions  Precautions Comment: none    Pain  Pain Assessment: 0-10  Pain Score: 2 (back pain 3-4/10)  Pain Location: Neck      Objective   No measures.     Treatments:  There-ex:   UBE x 5 min - upright bike L2 x5 min  Trunk flex with ball 10 x ea  Trunk flex with ball and S/B R/L 10 x ea  Prone opp UE/LE 2x10  Thoracic ext over 1/2 foam roll 2 x 10   Wall royce iso [id number]  T-band rows BTB 2 x 10  T-band ext BTB 2 x 10   B ER GTB 2x10  B horizontal ABD 2x10  T-band IR GTB 2 x 10  Standing wall shoulder flexion with T-bar 5# -towel roll behind head  T- bar ext 5# 2 x 10   Mod prone GSB shoulder dumbbells IYT 2# 2 x 10   Mod prone GSB shoulder extension 2# 2x10 - held  Mod prone rows 2# 2 x 10 - held  Scap retraction 2 x 10 4# - held  Assessment:   Pt challenged by today's progression of exercise with experiencing muscle  soreness/fatigue. Pt reported no increase in familiar s/s of neck or low back pain at end of session. Pt did not experience as much muscle cramping in LE with mod prone IYT exercise and discussed keeping slight bend in knees to prevent discomfort. Pt continues to progress well with exercise and notes feeling progress with continued physical therapy.    Plan:   Plan to re-assess next visit due to last session scheduled.    Goals:  Active       PT Problem       STG: Reduce pain at worst to 4/10 with all prior activity in her spine.  (Met)       Start:  01/09/24    Expected End:  01/29/24    Resolved:  02/06/24         STG: Pt to sit with good posture approx 50-75% of the time.  (Met)       Start:  01/09/24    Expected End:  01/29/24    Resolved:  02/06/24         Reduce pain at worst to 1-2/10 with all prior activity.  (Progressing)       Start:  01/09/24    Expected End:  02/20/24            Increase cervical flexion to 60 degrees, ext to 55 degrees, rotation bilat to 75 degrees for posture, driving and self care.  (Progressing)       Start:  01/09/24    Expected End:  02/20/24            Increase lumbar flexion to 100%, ext to %, rotation bilat to 100% for self care, posture and ADL's.  (Progressing)       Start:  01/09/24    Expected End:  02/20/24            Pt to have decrease in Oswestry by 10% to display increased overall function. Pt to have NDI score decreased by 10% to display increased overall function.  (Progressing)       Start:  01/09/24    Expected End:  02/20/24            Pt to be independent with a HEP for self management.  (Progressing)       Start:  01/09/24    Expected End:  02/20/24             Note signed by MARC Torrez.

## 2024-03-06 ENCOUNTER — TREATMENT (OUTPATIENT)
Dept: PHYSICAL THERAPY | Facility: CLINIC | Age: 29
End: 2024-03-06
Payer: COMMERCIAL

## 2024-03-06 DIAGNOSIS — N62 MACROMASTIA: ICD-10-CM

## 2024-03-06 DIAGNOSIS — M54.2 NECK PAIN: Primary | ICD-10-CM

## 2024-03-06 DIAGNOSIS — M54.9 MID BACK PAIN: ICD-10-CM

## 2024-03-06 DIAGNOSIS — M54.50 LOW BACK PAIN: ICD-10-CM

## 2024-03-06 PROCEDURE — 97110 THERAPEUTIC EXERCISES: CPT | Mod: GP | Performed by: PHYSICAL THERAPIST

## 2024-03-06 ASSESSMENT — PAIN - FUNCTIONAL ASSESSMENT: PAIN_FUNCTIONAL_ASSESSMENT: 0-10

## 2024-03-06 ASSESSMENT — PAIN SCALES - GENERAL: PAINLEVEL_OUTOF10: 1

## 2024-03-06 NOTE — PROGRESS NOTES
Physical Therapy    Physical Therapy Treatment    Patient Name: Stacy Coffman  MRN: [id number]  Today's Date: 3/6/2024  Time Calculation  Start Time: 1655  Stop Time: 1740  Time Calculation (min): 45 min     PT Therapeutic Procedures Time Entry  Therapeutic Exercise Time Entry: 45                 Payor: [employer] / Plan: [employer] / Product Type: *No Product type* /     Reason for Referral: neck pain  General Comment: visit 9 of 10 (visits [place])    Current Problem    Problem List Items Addressed This Visit             ICD-10-CM    Neck pain - Primary M54.2    Mid back pain M54.9    Low back pain M54.50     Other Visit Diagnoses         Codes    Macromastia     N62             Subjective   Pt notes she experienced sharp pain on R mid thoracic region that has continued to be an ache in that area and increased with occasional rotating and turning. Otherwise, pt notes improvement in overall pain and notices sitting with better posture throughout day. Pt notes neck pain is around a 1/10 and lower back pain is around 3-4/10 pain at start of session and does not get any worse in neck or low back. Pt notes she is able to stand for longer period of time (30-45 min) before back pain start and needs to sit to rest.   Compliance with HEP- yes  Precautions  Precautions  Precautions Comment: none    Pain  Pain Assessment: 0-10  Pain Score: 1  Pain Location: Neck    Objective   No measures.     Treatments:  There-ex:   UBE x 5 min - upright bike L2 x5 min  Updated HEP x 5 min  Trunk flex with ball 10 x ea  Trunk flex with ball and S/B R/L 10 x ea  Prone opp UE/LE 2x10  Thoracic ext over 1/2 foam roll 2 x 10   Wall royce iso 3x10 seconds - added to HEP   3 way posture 3x 10se each - added to HEP  Scap retraction 2 x 10 4#   T-band rows BTB 2 x 10 - added to HEP  T-band ext BTB 2 x 10 - added to HEP  B ER GTB 2x10 - added to HEP  B horizontal ABD GTB 2x10  T-band IR BTB 2 x 10 BL  Standing wall shoulder flexion with T-bar 5#  T-  bar ext 5# 2 x 10   Mod prone GSB shoulder dumbbells IYT 2# 2 x 10   Mod prone GSB shoulder extension 2# 2x10   Mod prone rows 2# 2 x 10     Assessment:   Pt tolerated today's session well with no reports of increased familiar s/s of neck or back pain. Pt reported decrease in R thoracic pain throughout progression of today's treatment session. Pt challenged by progression of exercise with added repetitions and resistance to exercise experiencing muscle fatigue/soreness at end of session. Updated HEP program and discussed with pt to schedule one more visit for re-assessment of objective measures.     Plan:   Discussed to schedule one more visit and plan to re-check next visit.    Goals:  Active       PT Problem       STG: Reduce pain at worst to 4/10 with all prior activity in her spine.  (Met)       Start:  01/09/24    Expected End:  01/29/24    Resolved:  02/06/24         STG: Pt to sit with good posture approx 50-75% of the time.  (Met)       Start:  01/09/24    Expected End:  01/29/24    Resolved:  02/06/24         Reduce pain at worst to 1-2/10 with all prior activity.  (Progressing)       Start:  01/09/24    Expected End:  02/20/24            Increase cervical flexion to 60 degrees, ext to 55 degrees, rotation bilat to 75 degrees for posture, driving and self care.  (Progressing)       Start:  01/09/24    Expected End:  02/20/24            Increase lumbar flexion to 100%, ext to %, rotation bilat to 100% for self care, posture and ADL's.  (Progressing)       Start:  01/09/24    Expected End:  02/20/24            Pt to have decrease in Oswestry by 10% to display increased overall function. Pt to have NDI score decreased by 10% to display increased overall function.  (Progressing)       Start:  01/09/24    Expected End:  02/20/24            Pt to be independent with a HEP for self management.  (Progressing)       Start:  01/09/24    Expected End:  02/20/24             Note signed by Jeri Kraus SPT.

## 2024-03-12 ENCOUNTER — TREATMENT (OUTPATIENT)
Dept: PHYSICAL THERAPY | Facility: CLINIC | Age: 29
End: 2024-03-12
Payer: COMMERCIAL

## 2024-03-12 DIAGNOSIS — N62 MACROMASTIA: ICD-10-CM

## 2024-03-12 DIAGNOSIS — M54.50 LOW BACK PAIN: ICD-10-CM

## 2024-03-12 DIAGNOSIS — M54.9 MID BACK PAIN: ICD-10-CM

## 2024-03-12 DIAGNOSIS — M54.2 NECK PAIN: Primary | ICD-10-CM

## 2024-03-12 PROCEDURE — 97110 THERAPEUTIC EXERCISES: CPT | Mod: GP | Performed by: PHYSICAL THERAPIST

## 2024-03-12 ASSESSMENT — PAIN - FUNCTIONAL ASSESSMENT: PAIN_FUNCTIONAL_ASSESSMENT: 0-10

## 2024-03-12 ASSESSMENT — PAIN SCALES - GENERAL: PAINLEVEL_OUTOF10: 2

## 2024-03-12 NOTE — PROGRESS NOTES
Physical Therapy    Physical Therapy Treatment/Discharge    Patient Name: Stacy Coffman  MRN: [id number]  Today's Date: 3/12/2024  Time Calculation  Start Time: 0750  Stop Time: 0830  Time Calculation (min): 40 min  PT Therapeutic Procedures Time Entry  Therapeutic Exercise Time Entry: 40  Payor: H[employer]ANTH / Plan: [employer] HMP / Product Type: *No Product type* /     Reason for Referral: neck pain  General Comment: visit 10 of 10 (visits [place])    Current Problem    Problem List Items Addressed This Visit             ICD-10-CM    Neck pain - Primary M54.2    Mid back pain M54.9    Low back pain M54.50     Other Visit Diagnoses         Codes    Macromastia     N62           Subjective   Pt overall feels she is doing well enough to continue with exercises on her own. Notes still some pain in mid to lower back and her neck. Pt feels therapy has helped.   Compliance with HEP-yes   Precautions  Precautions  Precautions Comment: none    Pain  Pain Assessment: 0-10  Pain Score: 2  Pain Location: Neck    Objective   Posture:  slight rounded shoulder/forward head     Upper extremity ROM: WNL Unless documented below:  LE ROM WNL      Upper Extremity Strength:   RIGHT LEFT   Shoulder Flexion 5 5   Shoulder Abduction 5 5   Shoulder ER 5 5   Shoulder IR 5 5   Rhomboids 4+ 4+   Mid trap  4+ 4+   LE strength: Grossly 5/5 throughout     Cervical ROM:  Date: eval ROM-degrees    Flexion 50    Extension 55     RIGHT LEFT   Side bend     Rotation 75 70     Lumbar ROM:  Date: eval Percentage     Flexion 100%    Extension 75%     RIGHT LEFT   Side bend 100% 100%   Rotation 100% 100%     Other Measures  Neck Disability Index: 12  Oswestry Disablity Index (PEREZ): 10%     Treatments:  There-ex:   UBE x 5 min - upright bike L2 x5 min  Updated HEP x 5 min  Trunk flex with ball 10 x ea  Trunk flex with ball and S/B R/L 10 x ea  Prone opp UE/LE 2x10  Thoracic ext over 1/2 foam roll 2 x 10   Scap retraction 2 x 10 4#   B horizontal ABD GTB  2x10  T-band IR BTB 2 x 10 BL  Standing wall shoulder flexion with T-bar 5#  T- bar ext 5# 2 x 10   Mod prone GSB shoulder dumbbells IYT 2# 2 x 10   Mod prone GSB shoulder extension 2# 2x10   Mod prone rows 2# 2 x 10     Updated HEP:  Access Code: [email or web address]  URL: [email or web address]  Date: 03/12/2024  Prepared by: JACKELINE St    Exercises  - Seated Thoracic Lumbar Extension  - 1 x daily - 7 x weekly - 1 sets - 10 reps - 5 sec hold  - Seated Cervical Sidebending Stretch  - 1 x daily - 7 x weekly - 1 sets - 5 reps - 15 sec hold  - Cat Cow to Child's Pose  - 1 x daily - 7 x weekly - 2 sets - 10 reps  - Child's Pose Stretch  - 1 x daily - 7 x weekly - 1 sets - 5 reps - 10 sec hold  - Standing Lumbar Extension  - 1 x daily - 7 x weekly - 2 sets - 10 reps  - Standing Row with Anchored Resistance  - 1 x daily - 7 x weekly - 2 sets - 10 reps  - Shoulder extension with resistance - Neutral  - 1 x daily - 7 x weekly - 2 sets - 10 reps  - Standing Shoulder External Rotation with Resistance  - 1 x daily - 3-4 x weekly - 2 sets - 10 reps  - Wall [place]  - 1 x daily - 3-4 x weekly - 3 sets - 10-15 seconds hold  - Prone Lower Trapezius Strengthening on Swiss Ball  - 1 x daily - 7 x weekly - 2 sets - 10 reps  - Prone Middle Trapezius Strengthening on Swiss Ball  - 1 x daily - 7 x weekly - 2 sets - 10 reps  - Prone Shoulder Extension on Swiss Ball  - 1 x daily - 7 x weekly - 2 sets - 10 reps  - Prone Scapular Retraction on Swiss Ball  - 1 x daily - 7 x weekly - 2 sets - 10 reps    Assessment:  Pt overall has progressed well thus far with therapy with reduced cervical and back pain, improved posture and improving strength. Overall still some limitations but should continue to do well with HEP. Pt has met approx % of her LTG's at this time and is recommended to discharge ot her HEP. Pt has a good prognosis going forward with her HEP performance.     Plan:  Discharge to [facility] today.     Goals:  Resolved       PT  Problem       STG: Reduce pain at worst to 4/10 with all prior activity in her spine.  (Met)       Start:  01/09/24    Expected End:  01/29/24    Resolved:  02/06/24         STG: Pt to sit with good posture approx 50-75% of the time.  (Met)       Start:  01/09/24    Expected End:  01/29/24    Resolved:  02/06/24         Reduce pain at worst to 1-2/10 with all prior activity.  (Met)       Start:  01/09/24    Expected End:  02/20/24    Resolved:  03/12/24         Increase cervical flexion to 60 degrees, ext to 55 degrees, rotation bilat to 75 degrees for posture, driving and self care.  (Adequate for Discharge)       Start:  01/09/24    Expected End:  02/20/24            Increase lumbar flexion to 100%, ext to %, rotation bilat to 100% for self care, posture and ADL's.  (Met)       Start:  01/09/24    Expected End:  02/20/24    Resolved:  03/12/24         Pt to have decrease in Oswestry by 10% to display increased overall function. Pt to have NDI score decreased by 10% to display increased overall function.  (Met)       Start:  01/09/24    Expected End:  02/20/24    Resolved:  03/12/24         Pt to be independent with a HEP for self management.  (Met)       Start:  01/09/24    Expected End:  02/20/24    Resolved:  03/12/24

## 2024-07-08 ENCOUNTER — OFFICE VISIT (OUTPATIENT)
Dept: URGENT CARE | Facility: CLINIC | Age: 29
End: 2024-07-08
Payer: COMMERCIAL

## 2024-07-08 DIAGNOSIS — H61.23 BILATERAL IMPACTED CERUMEN: ICD-10-CM

## 2024-07-08 DIAGNOSIS — R42 VERTIGO: Primary | ICD-10-CM

## 2024-07-08 PROCEDURE — 99213 OFFICE O/P EST LOW 20 MIN: CPT | Performed by: NURSE PRACTITIONER

## 2024-07-08 RX ORDER — MECLIZINE HCL 12.5 MG 12.5 MG/1
12.5 TABLET ORAL 3 TIMES DAILY PRN
Qty: 14 TABLET | Refills: 0 | Status: SHIPPED | OUTPATIENT
Start: 2024-07-08

## 2024-07-08 NOTE — LETTER
July 8, 2024     Patient: Stacy Coffman   YOB: 1995   Date of Visit: 7/8/2024       To Whom It May Concern:    Stacy Coffman was seen in my clinic on 7/8/2024 at 2:20 pm. Please excuse Stacy for her absence from work on this day to make the appointment.    If you have any questions or concerns, please don't hesitate to call.     [phone]    Sincerely,         KALIA Cuevas-CNP        CC: No Recipients

## 2024-07-08 NOTE — PROGRESS NOTES
Subjective   Patient ID: Stacy Coffman is a 28 y.o. female.    Patient complains of vertigo since this morning.  Does have a history of vertigo, states it is somewhat aggravated with movement.  Symptoms this happens when her ears are full of wax and she is concerned about that.  Has seen physical therapy before but not for vestibular therapy and is established with a primary care doctor tomorrow.  No treatment prior to arrival.  Denies any difficulty with speech, unequal smile, numbness, tingling, nausea or vomiting.          The following portions of the chart were reviewed this encounter and updated as appropriate:         Review of Systems   All other systems reviewed and are negative.    Objective   Physical Exam  Vitals and nursing note reviewed.   Constitutional:       General: She is not in acute distress.     Appearance: Normal appearance. She is not toxic-appearing.   HENT:      Head: Normocephalic.      Right Ear: External ear normal. There is impacted cerumen.      Left Ear: External ear normal. There is impacted cerumen.      Nose: Nose normal.      Mouth/Throat:      Mouth: Mucous membranes are moist.      Pharynx: Oropharynx is clear. Uvula midline. No oropharyngeal exudate, posterior oropharyngeal erythema or uvula swelling.      Tonsils: No tonsillar exudate or tonsillar abscesses. 1+ on the right. 1+ on the left.   Eyes:      Extraocular Movements: Extraocular movements intact.   Cardiovascular:      Rate and Rhythm: Normal rate and regular rhythm.      Heart sounds: Normal heart sounds.   Pulmonary:      Effort: Pulmonary effort is normal.      Breath sounds: Normal breath sounds. No wheezing.   Musculoskeletal:         General: Normal range of motion.      Cervical back: Normal range of motion and neck supple.   Skin:     Capillary Refill: Capillary refill takes less than 2 seconds.   Neurological:      General: No focal deficit present.      Mental Status: She is alert and oriented to  person, place, and time.      GCS: GCS eye subscore is 4. GCS verbal subscore is 5. GCS motor subscore is 6.      Cranial Nerves: Cranial nerves 2-12 are intact.      Sensory: Sensation is intact.      Motor: Motor function is intact.      Coordination: Coordination is intact.      Gait: Gait is intact.   Psychiatric:         Mood and Affect: Mood normal.         Behavior: Behavior normal.         Thought Content: Thought content normal.       Procedures  Procedure explained to patient verbal consent given, risk being worsening of her dizziness, perforated eardrum or infection as well as canal trauma.  Ears irrigated bilaterally with warm water and peroxide with minimal wax removed.  Symptoms slightly improved.    Assessment/Plan   Diagnoses and all orders for this visit:  Vertigo  -     meclizine (Antivert) 12.5 mg tablet; Take 1 tablet (12.5 mg) by mouth 3 times a day as needed for dizziness or nausea for up to 14 doses.  Bilateral impacted cerumen  -     Ear Cerumen Removal; Future  Meclizine for as needed as needed dizziness  Follow with primary care  Tomorrow patient may need a referral to physical therapy but she does have a primary care appointment tomorrow let them take care of that.    Patient disposition: Home

## 2024-07-14 ENCOUNTER — HOSPITAL ENCOUNTER (EMERGENCY)
Facility: HOSPITAL | Age: 29
Discharge: HOME | End: 2024-07-14
Payer: COMMERCIAL

## 2024-07-14 VITALS
SYSTOLIC BLOOD PRESSURE: 128 MMHG | RESPIRATION RATE: 16 BRPM | WEIGHT: 293 LBS | OXYGEN SATURATION: 98 % | HEART RATE: 67 BPM | BODY MASS INDEX: 44.41 KG/M2 | TEMPERATURE: 97.3 F | DIASTOLIC BLOOD PRESSURE: 75 MMHG | HEIGHT: 68 IN

## 2024-07-14 DIAGNOSIS — H92.02 LEFT EAR PAIN: Primary | ICD-10-CM

## 2024-07-14 PROCEDURE — 2500000001 HC RX 250 WO HCPCS SELF ADMINISTERED DRUGS (ALT 637 FOR MEDICARE OP): Performed by: PHYSICIAN ASSISTANT

## 2024-07-14 PROCEDURE — 99283 EMERGENCY DEPT VISIT LOW MDM: CPT

## 2024-07-14 RX ORDER — AMOXICILLIN 500 MG/1
500 CAPSULE ORAL ONCE
Status: COMPLETED | OUTPATIENT
Start: 2024-07-14 | End: 2024-07-14

## 2024-07-14 RX ORDER — AMOXICILLIN 500 MG/1
500 CAPSULE ORAL 3 TIMES DAILY
Qty: 21 CAPSULE | Refills: 0 | Status: SHIPPED | OUTPATIENT
Start: 2024-07-14 | End: 2024-07-21

## 2024-07-14 ASSESSMENT — COLUMBIA-SUICIDE SEVERITY RATING SCALE - C-SSRS
1. IN THE PAST MONTH, HAVE YOU WISHED YOU WERE DEAD OR WISHED YOU COULD GO TO SLEEP AND NOT WAKE UP?: NO
2. HAVE YOU ACTUALLY HAD ANY THOUGHTS OF KILLING YOURSELF?: NO
6. HAVE YOU EVER DONE ANYTHING, STARTED TO DO ANYTHING, OR PREPARED TO DO ANYTHING TO END YOUR LIFE?: NO

## 2024-07-14 ASSESSMENT — PAIN DESCRIPTION - ORIENTATION: ORIENTATION: LEFT

## 2024-07-14 ASSESSMENT — PAIN DESCRIPTION - PAIN TYPE: TYPE: ACUTE PAIN

## 2024-07-14 ASSESSMENT — PAIN - FUNCTIONAL ASSESSMENT: PAIN_FUNCTIONAL_ASSESSMENT: 0-10

## 2024-07-14 ASSESSMENT — PAIN SCALES - GENERAL: PAINLEVEL_OUTOF10: 8

## 2024-07-14 ASSESSMENT — PAIN DESCRIPTION - LOCATION: LOCATION: EAR

## 2024-07-14 NOTE — ED PROVIDER NOTES
EMERGENCY MEDICINE EVALUATION NOTE    History of Present Illness     Chief Complaint:   Chief Complaint   Patient presents with    Earache     Pt c/o earache and sore throat x 6 days       HPI: Stacy Coffman is a 28 y.o. female presents with a chief complaint of ear pain and sore throat for 6 days.  Patient reports it has been going on since the beginning of last week.  She states that she was seated in urgent care earlier the week where she was having episodes of vertigo and she that was eventually due to an ear infection.  When her urine results today stated it was some fluid behind it but was not infected.  Patient states that her symptoms are persistent and they have gotten worse.  She denies any fevers or chills denies any nausea or vomiting.  Patient states he is currently not experiencing any vertigo.  She reports she is concerned that the fluid behind her ear has become an infection.  Patient been taking ibuprofen at home for symptoms which only minimally helps.  Patient denies any associated nausea or vomiting.  Patient denies any antibiotic allergies.    Previous History     Past Medical History:   Diagnosis Date    Chest pain 01/23/2024    Closed fracture of talus 01/23/2024    Dizziness 01/23/2024    Personal history of other mental and behavioral disorders 06/15/2015    History of depression    Sprain of left ankle 01/23/2024     Past Surgical History:   Procedure Laterality Date    OTHER SURGICAL HISTORY  08/24/2020    Lithotripsy     Social History     Tobacco Use    Smoking status: Never    Smokeless tobacco: Never   Vaping Use    Vaping status: Never Used   Substance Use Topics    Drug use: Never     No family history on file.  No Known Allergies  Current Outpatient Medications   Medication Instructions    acetaZOLAMIDE (Diamox) 250 mg tablet 1 tablet, oral, 2 times daily    Advair Diskus 250-50 mcg/dose diskus inhaler INHALE 1 PUFF 2 TIMES A DAY UNTIL DIRECTED TO STOP.    albuterol 90  "mcg/actuation inhaler INHALE 2 PUFFS EVERY 4 HOURS UNTIL DIRECTED TO STOP. TAKE ONLY AS NEEDED.    amoxicillin (AMOXIL) 500 mg, oral, 3 times daily    cetirizine (ZyrTEC) 10 mg tablet oral    etonogestreL-ethinyl estradioL (Nuvaring) 0.12-0.015 mg/24 hr vaginal ring vaginal    FLUoxetine (PROzac) 40 mg capsule PROzac 40 MG Oral Capsule   Refills: 0        Start : 19-Apr-2022   Active    fluticasone (Flonase) 50 mcg/actuation nasal spray nasal    hydrocortisone (Anusol-HC) 2.5 % rectal cream 1 Application, rectal, 2 times daily, Apply rectally 2 times daily    meclizine (ANTIVERT) 12.5 mg, oral, 3 times daily PRN    methylPREDNISolone (Medrol Dospak) 4 mg tablets Follow schedule on package instructions.    tamsulosin (Flomax) 0.4 mg 24 hr capsule oral, Daily RT       Physical Exam     Appearance: Alert, oriented , cooperative     Skin: Intact,  dry skin, no lesions, rash, petechiae or purpura.      Eyes: PERRLA, EOMs intact,  Conjunctiva pink      ENT: Hearing grossly intact. Pharynx clear, uvula midline.  Left tympanic membrane has fluid behind with slight erythema, right tympanic membrane within normal limits.     Neck: Supple. Trachea at midline.      Pulmonary: Clear bilaterally. No rales, rhonchi or wheezing. No accessory muscle use or stridor.     Cardiac: Normal rate and rhythm without murmur     Abdomen: Soft, nontender, active bowel sounds.     Musculoskeletal: Full range of motion.      Neurological:Cranial nerves II through XII are grossly intact, normal sensation, no weakness, no focal findings identified.     Results   Labs Reviewed - No data to display  No orders to display         ED Course & Medical Decision Making     Medications   amoxicillin (Amoxil) capsule 500 mg (has no administration in time range)     Heart Rate:  [67]   Temperature:  [36.3 °C (97.3 °F)]   Respirations:  [16]   BP: (128)/(75)   Height:  [172.7 cm (5' 8\")]   Weight:  [134 kg (295 lb)]   Pulse Ox:  [98 %]    ED Course as of " 07/14/24 0715   Sun Jul 14, 2024   0704 Plan of care discussed with the patient.  Patient has what appears to be the beginnings of an otitis media on the left side.  There is fluid behind TM with some slight erythema but no obvious gross infection.  Due to patient's symptoms been going on for a week we will treat her with antibiotics.  Patient be given a dose of amoxicillin here in the emergency department.  She will be discharged home with amoxicillin for home.  She was instructed to follow-up with her primary care provider in 1 to 2 days or to return here with any worsening symptoms.  Patient instructed to take Tylenol Motrin at home for pain. [CJ]      ED Course User Index  [CJ] Narciso Barron PA-C         Diagnoses as of 07/14/24 0715   Left ear pain       Procedures   Procedures    Diagnosis     1. Left ear pain        Disposition   Discharged    ED Prescriptions       Medication Sig Dispense Start Date End Date Auth. Provider    amoxicillin (Amoxil) 500 mg capsule Take 1 capsule (500 mg) by mouth 3 times a day for 7 days. 21 capsule 7/14/2024 7/21/2024 Narciso Barron PA-C            Disclaimer: This note was dictated by speech recognition. Minor errors in transcription may be present. Please call if questions.       Narciso Barron PA-C  07/14/24 0717

## 2024-07-28 ENCOUNTER — HOSPITAL ENCOUNTER (EMERGENCY)
Facility: HOSPITAL | Age: 29
Discharge: HOME | End: 2024-07-28
Payer: COMMERCIAL

## 2024-07-28 ENCOUNTER — APPOINTMENT (OUTPATIENT)
Dept: RADIOLOGY | Facility: HOSPITAL | Age: 29
End: 2024-07-28
Payer: COMMERCIAL

## 2024-07-28 VITALS
TEMPERATURE: 99.1 F | WEIGHT: 293 LBS | HEART RATE: 66 BPM | HEIGHT: 67 IN | BODY MASS INDEX: 45.99 KG/M2 | SYSTOLIC BLOOD PRESSURE: 126 MMHG | DIASTOLIC BLOOD PRESSURE: 55 MMHG | RESPIRATION RATE: 16 BRPM | OXYGEN SATURATION: 96 %

## 2024-07-28 DIAGNOSIS — S90.31XA CONTUSION OF RIGHT FOOT, INITIAL ENCOUNTER: ICD-10-CM

## 2024-07-28 DIAGNOSIS — S93.402A SPRAIN OF LEFT ANKLE, UNSPECIFIED LIGAMENT, INITIAL ENCOUNTER: Primary | ICD-10-CM

## 2024-07-28 PROCEDURE — 73610 X-RAY EXAM OF ANKLE: CPT | Mod: LEFT SIDE | Performed by: RADIOLOGY

## 2024-07-28 PROCEDURE — 73610 X-RAY EXAM OF ANKLE: CPT | Mod: LT

## 2024-07-28 PROCEDURE — 99284 EMERGENCY DEPT VISIT MOD MDM: CPT

## 2024-07-28 PROCEDURE — 73630 X-RAY EXAM OF FOOT: CPT | Mod: LEFT SIDE | Performed by: RADIOLOGY

## 2024-07-28 PROCEDURE — 2500000001 HC RX 250 WO HCPCS SELF ADMINISTERED DRUGS (ALT 637 FOR MEDICARE OP): Performed by: NURSE PRACTITIONER

## 2024-07-28 PROCEDURE — 73630 X-RAY EXAM OF FOOT: CPT | Mod: RT

## 2024-07-28 RX ORDER — IBUPROFEN 600 MG/1
600 TABLET ORAL ONCE
Status: COMPLETED | OUTPATIENT
Start: 2024-07-28 | End: 2024-07-28

## 2024-07-28 ASSESSMENT — COLUMBIA-SUICIDE SEVERITY RATING SCALE - C-SSRS
6. HAVE YOU EVER DONE ANYTHING, STARTED TO DO ANYTHING, OR PREPARED TO DO ANYTHING TO END YOUR LIFE?: NO
2. HAVE YOU ACTUALLY HAD ANY THOUGHTS OF KILLING YOURSELF?: NO
1. IN THE PAST MONTH, HAVE YOU WISHED YOU WERE DEAD OR WISHED YOU COULD GO TO SLEEP AND NOT WAKE UP?: NO

## 2024-07-28 ASSESSMENT — LIFESTYLE VARIABLES
EVER FELT BAD OR GUILTY ABOUT YOUR DRINKING: NO
HAVE PEOPLE ANNOYED YOU BY CRITICIZING YOUR DRINKING: NO
TOTAL SCORE: 0
EVER HAD A DRINK FIRST THING IN THE MORNING TO STEADY YOUR NERVES TO GET RID OF A HANGOVER: NO
HAVE YOU EVER FELT YOU SHOULD CUT DOWN ON YOUR DRINKING: NO

## 2024-07-28 ASSESSMENT — PAIN DESCRIPTION - ORIENTATION: ORIENTATION: RIGHT

## 2024-07-28 ASSESSMENT — PAIN - FUNCTIONAL ASSESSMENT: PAIN_FUNCTIONAL_ASSESSMENT: 0-10

## 2024-07-28 ASSESSMENT — PAIN DESCRIPTION - LOCATION: LOCATION: FOOT

## 2024-07-28 ASSESSMENT — PAIN SCALES - GENERAL
PAINLEVEL_OUTOF10: 7
PAINLEVEL_OUTOF10: 9

## 2024-07-28 ASSESSMENT — PAIN DESCRIPTION - PAIN TYPE: TYPE: ACUTE PAIN

## 2024-07-28 ASSESSMENT — VISUAL ACUITY: OU: 1

## 2024-07-29 NOTE — ED PROVIDER NOTES
HPI   Chief Complaint   Patient presents with    Foot Injury     Right foot       Patient presents the emergency department for evaluation of a foot injury that occurred a few hours ago.  Patient was running down the steps and some good supportive shoes when she missed stepped and caught herself however she did cause injury to her right foot.  She has noticed bruising and swelling with pain to the great toe along with concern for sprain to the left ankle.  She has noticed swelling to the lateral aspect of the ankle and pain with flexion.  She has a history of frequent ankle sprains on the left and fifth toe fracture on the right.  No surgical intervention to either in the past.  She has not taken any over-the-counter medication prior to arrival for her discomfort.  She has been able to fully weight-bear with discomfort.  Otherwise there is no head injury, loss consciousness, neck pain, back pain, chest pain, abdominal pain, numbness, weakness or concern for pregnancy.  She is not on any anticoagulants and she has not noticed any open wounds.      History provided by:  Patient and significant other   used: No                          [place] Coma Scale Score: 15                  Patient History   Past Medical History:   Diagnosis Date    Chest pain 01/23/2024    Closed fracture of talus 01/23/2024    Dizziness 01/23/2024    Personal history of other mental and behavioral disorders 06/15/2015    History of depression    Sprain of left ankle 01/23/2024     Past Surgical History:   Procedure Laterality Date    OTHER SURGICAL HISTORY  08/24/2020    Lithotripsy     No family history on file.  Social History     Tobacco Use    Smoking status: Never    Smokeless tobacco: Never   Vaping Use    Vaping status: Never Used   Substance Use Topics    Alcohol use: Not on file    Drug use: Never       Physical Exam   Visit Vitals  /55   Pulse 66   Temp 37.3 °C (99.1 °F) (Skin)   Resp 16   Ht 1.702 m (5'  "7\")   Wt 135 kg (298 lb)   SpO2 96%   BMI 46.67 kg/m²   OB Status Having periods   Smoking Status Never   BSA 2.53 m²      Physical Exam  Vitals reviewed.   Constitutional:       Appearance: She is not toxic-appearing.   HENT:      Head: Normocephalic and atraumatic.      Comments: No outward sign of trauma.     Right Ear: Hearing normal.      Left Ear: Hearing normal.      Nose: Nose normal.      Mouth/Throat:      Lips: Pink.      Mouth: Mucous membranes are moist.      Pharynx: Oropharynx is clear.   Eyes:      General: Lids are normal. Vision grossly intact.      Pupils: Pupils are equal, round, and reactive to light.   Cardiovascular:      Rate and Rhythm: Normal rate and regular rhythm.      Pulses:           Dorsalis pedis pulses are 2+ on the right side and 2+ on the left side.   Pulmonary:      Effort: Pulmonary effort is normal.      Breath sounds: Normal breath sounds.   Musculoskeletal:      Cervical back: Full passive range of motion without pain and neck supple.      Right lower leg: No edema.      Left lower leg: No edema.      Comments: There is no bony tenderness to the proximal tib-fib on the right.  No bony tenderness until the distal aspect of the first right metatarsal where there is mild swelling and ecchymosis through the distal MCP and MCP joint.  The toe nail is intact with no subungual hematoma.  There is no laceration.  Compartments are soft.  Neurovascularly intact.  No ankle tenderness, Achilles tendon tenderness or lateral tenderness of the foot.  To the left lower extremity there is no proximal tib-fib tenderness or tenderness upon palpation except for the distal left fibula.  There is mild swelling with no ecchymosis or open wound.  Negative anterior drawer.  Strong pedal pulse and compartments are soft.  Neurovascularly intact bilaterally.   Skin:     General: Skin is warm and dry.      Capillary Refill: Capillary refill takes less than 2 seconds.      Findings: No laceration or " wound.   Neurological:      General: No focal deficit present.      Mental Status: She is alert and oriented to person, place, and time.   Psychiatric:         Behavior: Behavior is cooperative.         XR foot right 3+ views   Final Result   No acute fracture. Soft tissue swelling overlying the lateral   malleolus.             MACRO:   None        Signed by: Hira Burden 7/28/2024 10:09 PM   Dictation workstation:   [id number]      XR ankle left 3+ views   Final Result   No acute fracture. Soft tissue swelling overlying the lateral   malleolus.             MACRO:   None        Signed by: Hira Burden 7/28/2024 10:09 PM   Dictation workstation:   [id number]          Labs Reviewed - No data to display    ED Course & MDM     Medical Decision Making  Patient presents to the ED for evaluation of foot and ankle injuries after catching herself from falling. Differential diagnosis of fracture, sprain and contusion to mention a few. Plan is for x-rays, ice and ibuprofen. Patient provides consent.    Imaging as interpreted by radiologist negative for acute findings as documented above. Plan is subsequently for symptom control with ace wrap, buddy tape, postop shoe, over-the-counter Tylenol or ibuprofen as needed and with appropriate outpatient follow-up with orthopedics as provided on their discharge handout. Patient is educated on S/S to watch for indicative of re-evaluation in the ER setting including worsening of current symptoms not responding to the treatment plan. Patient verbalized understanding of instructions and is amenable to this treatment plan. Patient departed in stable condition with no social determinants of health that would obscure this outpatient management plan.        Diagnoses as of 07/28/24 5419   Sprain of left ankle, unspecified ligament, initial encounter   Contusion of right foot, initial encounter          Your medication list        ASK your doctor about these medications         Instructions Last Dose Given Next Dose Due   acetaZOLAMIDE 250 mg tablet  Commonly known as: Diamox           Advair Diskus 250-50 mcg/dose diskus inhaler  Generic drug: fluticasone propion-salmeteroL           albuterol 90 mcg/actuation inhaler           amoxicillin 500 mg capsule  Commonly known as: Amoxil  Ask about: Should I take this medication?      Take 1 capsule (500 mg) by mouth 3 times a day for 7 days.       etonogestreL-ethinyl estradioL 0.12-0.015 mg/24 hr vaginal ring  Commonly known as: Nuvaring           FLUoxetine 40 mg capsule  Commonly known as: PROzac           fluticasone 50 mcg/actuation nasal spray  Commonly known as: Flonase           hydrocortisone 2.5 % rectal cream  Commonly known as: Anusol-HC      Insert 1 Application into the rectum 2 times a day. Apply rectally 2 times daily       meclizine 12.5 mg tablet  Commonly known as: Antivert      Take 1 tablet (12.5 mg) by mouth 3 times a day as needed for dizziness or nausea for up to 14 doses.       methylPREDNISolone 4 mg tablets  Commonly known as: Medrol Dospak      Follow schedule on package instructions.       tamsulosin 0.4 mg 24 hr capsule  Commonly known as: Flomax           ZyrTEC 10 mg tablet  Generic drug: cetirizine                    Procedure  Time: 2230    SPLINT APPLICATION    Indication: foot and toe contusion  Performed By:  Michelle Mccarthy CNP  Risks, benefits and alternatives for the applicable procedure described. Opportunity for questions and answers provided to allow for informed decision making.  Consent: Verbal consent was obtained by the patient    Procedure:   No wounds or abrasion noted. Skin pink, warm, and dry and neurovascularly intact. A/an  buddy tape of right 1st and 2nd toes with ace wrap then post op shoe  was applied to the right foot. Active ROM intact with capillary refill brisk, skin pink, warm and dry. Patient neurovascularly intact after procedure and tolerated procedure well. Instructions reviewed  and understanding verbalized.       *This report was transcribed using voice recognition software.  Every effort was made to ensure accuracy; however, inadvertent computerized transcription errors may be present.*  KALIA Mike-ROSIE  07/28/24         Michelle Mccarthy, KALIA-ROSIE  07/28/24 3086

## 2024-07-29 NOTE — ED TRIAGE NOTES
Pt presents for a right foot injury. Started to fall on some steps but caught herself. Noticed pain and ecchymosis to the right foot this evening. Injury occurred this afternoon.

## 2024-11-03 ENCOUNTER — OFFICE VISIT (OUTPATIENT)
Dept: URGENT CARE | Age: 29
End: 2024-11-03
Payer: COMMERCIAL

## 2024-11-03 VITALS
HEART RATE: 90 BPM | OXYGEN SATURATION: 97 % | RESPIRATION RATE: 16 BRPM | TEMPERATURE: 98.1 F | DIASTOLIC BLOOD PRESSURE: 83 MMHG | SYSTOLIC BLOOD PRESSURE: 144 MMHG

## 2024-11-03 DIAGNOSIS — J06.9 ACUTE RESPIRATORY DISEASE: Primary | ICD-10-CM

## 2024-11-03 PROCEDURE — 99203 OFFICE O/P NEW LOW 30 MIN: CPT | Performed by: NURSE PRACTITIONER

## 2024-11-03 RX ORDER — AZITHROMYCIN 250 MG/1
TABLET, FILM COATED ORAL
Qty: 6 TABLET | Refills: 0 | Status: SHIPPED | OUTPATIENT
Start: 2024-11-03 | End: 2024-11-08

## 2024-11-03 ASSESSMENT — ENCOUNTER SYMPTOMS
SINUS PRESSURE: 1
ACTIVITY CHANGE: 0
APPETITE CHANGE: 0
STRIDOR: 0
EYES NEGATIVE: 1
FATIGUE: 1
WHEEZING: 0
FEVER: 0
SHORTNESS OF BREATH: 0
SINUS PAIN: 1
CHILLS: 1
COUGH: 1
SORE THROAT: 1
GASTROINTESTINAL NEGATIVE: 1
CARDIOVASCULAR NEGATIVE: 1

## 2025-01-21 ENCOUNTER — APPOINTMENT (OUTPATIENT)
Dept: ENDOCRINOLOGY | Facility: CLINIC | Age: 30
End: 2025-01-21
Payer: COMMERCIAL